# Patient Record
(demographics unavailable — no encounter records)

---

## 2025-02-23 NOTE — ASSESSMENT
[FreeTextEntry1] : cleft palate with associated bilateral eustachian tube dysfunction.  S/P BMT, doing well.  Ear tube check today: proper position and patent.   done today, [place] hearing threshold at lower end of normal - will follow.  Follow up in 3-6 months for next ear tube check.     Total time spent on patient encounter including review of patient's medical history, physical examination, interpretation of any indicated labs / imaging and counseling, excluding time spent performing any indicated procedures: 36 minutes.    David Stevens MD, MPH Director of Pediatric Otolaryngology [facility] / [facility]

## 2025-02-23 NOTE — ASSESSMENT
[FreeTextEntry1] : cleft palate with associated bilateral eustachian tube dysfunction.  S/P BMT, doing well.  Ear tube check today: proper position and patent.   done today, [place] hearing threshold at lower end of normal - will follow.  Follow up in 3-6 months for next ear tube check.     Total time spent on patient encounter including review of patient's medical history, physical examination, interpretation of any indicated labs / imaging and counseling, excluding time spent performing any indicated procedures: 36 minutes.    David Stevens MD, MPH Director of Pediatric Otolaryngology [facility] / [facility]

## 2025-02-23 NOTE — HISTORY OF PRESENT ILLNESS
[FreeTextEntry1] : Patient presents today s/p cleft palate repair and Bilateral Myringotomy and Tubes placement. She is with her grandparents who states she has been doing well without any new infections. No new complaints.

## 2025-02-23 NOTE — REASON FOR VISIT
[Subsequent Evaluation] : a subsequent evaluation for [FreeTextEntry2] : s/p cleft palate repair and Bilateral Myringotomy and Tubes placement.

## 2025-02-23 NOTE — PHYSICAL EXAM
[de-identified] : Mild flaky cerumen bilaterally. [de-identified] : Tymponostomy tube in anterior / inferior quadrant and patent bilaterally. [Midline] : trachea located in midline position [de-identified] : well healed cleft palate repair.  [Normal] : palpation of lymph nodes is normal

## 2025-02-23 NOTE — PHYSICAL EXAM
[de-identified] : Mild flaky cerumen bilaterally. [de-identified] : Tymponostomy tube in anterior / inferior quadrant and patent bilaterally. [Midline] : trachea located in midline position [de-identified] : well healed cleft palate repair.  [Normal] : palpation of lymph nodes is normal

## 2025-05-27 NOTE — HISTORY OF PRESENT ILLNESS
[FreeTextEntry1] : 19 month old patient presents to the office for right hand polydactyly, duplicate thumb No family hx of polydactyly or syndactyly. Born at 36 weeks, .  Parent denies complications with pregnancy or delivery.  Birth Weight : 6 lbs Current Weight : 24 lbs Formula Parent reports normal feeding and elimination patterns. Normal development to this point.  PMH: h/p cleft palate repair and ear tube placement 2024, Dr [place] in [place] parents report + snoring